# Patient Record
Sex: FEMALE | Race: WHITE | Employment: OTHER | URBAN - METROPOLITAN AREA
[De-identification: names, ages, dates, MRNs, and addresses within clinical notes are randomized per-mention and may not be internally consistent; named-entity substitution may affect disease eponyms.]

---

## 2022-05-07 ENCOUNTER — APPOINTMENT (OUTPATIENT)
Dept: GENERAL RADIOLOGY | Age: 87
End: 2022-05-07
Attending: EMERGENCY MEDICINE
Payer: MEDICARE

## 2022-05-07 ENCOUNTER — HOSPITAL ENCOUNTER (EMERGENCY)
Age: 87
Discharge: HOME | End: 2022-05-07
Attending: EMERGENCY MEDICINE
Payer: MEDICARE

## 2022-05-07 VITALS
OXYGEN SATURATION: 95 % | BODY MASS INDEX: 29.99 KG/M2 | HEART RATE: 64 BPM | RESPIRATION RATE: 14 BRPM | TEMPERATURE: 98 F | WEIGHT: 180 LBS | DIASTOLIC BLOOD PRESSURE: 73 MMHG | SYSTOLIC BLOOD PRESSURE: 177 MMHG | HEIGHT: 65 IN

## 2022-05-07 DIAGNOSIS — E87.1 HYPONATREMIA: ICD-10-CM

## 2022-05-07 DIAGNOSIS — R55 SYNCOPE AND COLLAPSE: Primary | ICD-10-CM

## 2022-05-07 LAB
ALBUMIN SERPL-MCNC: 3.5 G/DL (ref 3.4–5)
ALBUMIN/GLOB SERPL: 1.1 {RATIO} (ref 0.8–1.7)
ALP SERPL-CCNC: 39 U/L (ref 45–117)
ALT SERPL-CCNC: 28 U/L (ref 13–56)
ANION GAP SERPL CALC-SCNC: 6 MMOL/L (ref 3–18)
AST SERPL-CCNC: 21 U/L (ref 10–38)
BASOPHILS # BLD: 0 K/UL (ref 0–0.1)
BASOPHILS NFR BLD: 0 % (ref 0–2)
BILIRUB SERPL-MCNC: 0.4 MG/DL (ref 0.2–1)
BNP SERPL-MCNC: 1265 PG/ML (ref 0–1800)
BUN SERPL-MCNC: 16 MG/DL (ref 7–18)
BUN/CREAT SERPL: 17 (ref 12–20)
CALCIUM SERPL-MCNC: 9 MG/DL (ref 8.5–10.1)
CHLORIDE SERPL-SCNC: 92 MMOL/L (ref 100–111)
CO2 SERPL-SCNC: 32 MMOL/L (ref 21–32)
CREAT SERPL-MCNC: 0.94 MG/DL (ref 0.6–1.3)
DIFFERENTIAL METHOD BLD: ABNORMAL
EOSINOPHIL # BLD: 0.1 K/UL (ref 0–0.4)
EOSINOPHIL NFR BLD: 2 % (ref 0–5)
ERYTHROCYTE [DISTWIDTH] IN BLOOD BY AUTOMATED COUNT: 12.6 % (ref 11.6–14.5)
GLOBULIN SER CALC-MCNC: 3.2 G/DL (ref 2–4)
GLUCOSE SERPL-MCNC: 153 MG/DL (ref 74–99)
HCT VFR BLD AUTO: 40.1 % (ref 35–45)
HGB BLD-MCNC: 13.8 G/DL (ref 12–16)
IMM GRANULOCYTES # BLD AUTO: 0 K/UL (ref 0–0.04)
IMM GRANULOCYTES NFR BLD AUTO: 0 % (ref 0–0.5)
LYMPHOCYTES # BLD: 1.9 K/UL (ref 0.9–3.6)
LYMPHOCYTES NFR BLD: 27 % (ref 21–52)
MAGNESIUM SERPL-MCNC: 2 MG/DL (ref 1.6–2.6)
MCH RBC QN AUTO: 31.8 PG (ref 24–34)
MCHC RBC AUTO-ENTMCNC: 34.4 G/DL (ref 31–37)
MCV RBC AUTO: 92.4 FL (ref 78–100)
MONOCYTES # BLD: 0.6 K/UL (ref 0.05–1.2)
MONOCYTES NFR BLD: 9 % (ref 3–10)
NEUTS SEG # BLD: 4.4 K/UL (ref 1.8–8)
NEUTS SEG NFR BLD: 62 % (ref 40–73)
NRBC # BLD: 0 K/UL (ref 0–0.01)
NRBC BLD-RTO: 0 PER 100 WBC
PLATELET # BLD AUTO: 190 K/UL (ref 135–420)
PMV BLD AUTO: 9 FL (ref 9.2–11.8)
POTASSIUM SERPL-SCNC: 3.7 MMOL/L (ref 3.5–5.5)
PROT SERPL-MCNC: 6.7 G/DL (ref 6.4–8.2)
RBC # BLD AUTO: 4.34 M/UL (ref 4.2–5.3)
SODIUM SERPL-SCNC: 130 MMOL/L (ref 136–145)
TROPONIN-HIGH SENSITIVITY: 11 NG/L (ref 0–54)
TROPONIN-HIGH SENSITIVITY: 17 NG/L (ref 0–54)
WBC # BLD AUTO: 7 K/UL (ref 4.6–13.2)

## 2022-05-07 PROCEDURE — 84484 ASSAY OF TROPONIN QUANT: CPT

## 2022-05-07 PROCEDURE — 80053 COMPREHEN METABOLIC PANEL: CPT

## 2022-05-07 PROCEDURE — 93005 ELECTROCARDIOGRAM TRACING: CPT

## 2022-05-07 PROCEDURE — 83735 ASSAY OF MAGNESIUM: CPT

## 2022-05-07 PROCEDURE — 71045 X-RAY EXAM CHEST 1 VIEW: CPT

## 2022-05-07 PROCEDURE — 85025 COMPLETE CBC W/AUTO DIFF WBC: CPT

## 2022-05-07 PROCEDURE — 99285 EMERGENCY DEPT VISIT HI MDM: CPT

## 2022-05-07 PROCEDURE — 83880 ASSAY OF NATRIURETIC PEPTIDE: CPT

## 2022-05-07 RX ORDER — CLONIDINE HYDROCHLORIDE 0.1 MG/1
TABLET ORAL 2 TIMES DAILY
COMMUNITY

## 2022-05-07 NOTE — ED PROVIDER NOTES
EMERGENCY DEPARTMENT HISTORY AND PHYSICAL EXAM    Date: 5/7/2022  Patient Name: Ese Jacinto    History of Presenting Illness     Chief Complaint   Patient presents with    Unresponsive       History Provided By: Patient, Patient's Daughter and EMS     History Zoey Lala):   9:23 AM  Ese Jacinto is a 82 y.o. female with PMHX of hypertension, CVA who presents to the emergency department by EMS C/O unresponsiveness onset this morning. Associated sxs include low blood pressure. Pt denies recollection of the incident or any other sxs or complaints. Patient and her daughter state that her  just passed away from cancer approximately 2 weeks ago. It was a prolonged fofana for 2 years at home. Patient remembers eating breakfast this morning. Daughter states that her  found the patient in the living room chair where she had been eating, but now she was diaphoretic and not responding. The daughter took the patient's blood pressure and it was low at 70/40. They then called EMS. Daughter reported patient may have taken too much Clonidine. EMS administered 1 mg ATropine for bradycardia 55-60 bmp upon their arrival.  Patient improved and is alert upon arrival to ED. Chief Complaint: unresponsiveness  Onset: today   Timing:  Acute  Context: Symptoms started spontaneously, symptoms have rapidly worsened since onset  Location: generalized  Quality: painless  Severity: Severe  Modifying Factors: Nothing makes it better, or worse. Associated Symptoms: low blood pressure    PCP: Roderick Proctor MD     Past History         Past Medical History:  Past Medical History:   Diagnosis Date    Hypertension     Stroke [facility])        Past Surgical History:  No past surgical history on file. Family History:  No family history on file.   Reviewed and non-contributory    Social History:  Social History     Tobacco Use    Smoking status: Never Smoker    Smokeless tobacco: Never Used   Substance Use Topics    Alcohol use: Not Currently    Drug use: Never       Medications:  Current Outpatient Medications   Medication Sig Dispense Refill    cloNIDine HCL (CATAPRES) 0.1 mg tablet Take  by mouth two (2) times a day. Allergies:  No Known Allergies    Review of Systems      Review of Systems   Constitutional: Negative for chills and fever. HENT: Negative for congestion, rhinorrhea and sore throat. Eyes: Negative for pain and visual disturbance. Respiratory: Negative for cough, shortness of breath and wheezing. Cardiovascular: Negative for chest pain and palpitations. Gastrointestinal: Negative for abdominal pain, diarrhea and vomiting. Genitourinary: Negative for dysuria, flank pain, frequency and urgency. Musculoskeletal: Negative for arthralgias and myalgias. Skin: Negative for rash and wound. Neurological: Positive for syncope. Negative for speech difficulty, weakness, light-headedness and headaches. Psychiatric/Behavioral: Negative for agitation and confusion. All other systems reviewed and are negative. Physical Exam     Vitals:    05/07/22 1242 05/07/22 1246 05/07/22 1522 05/07/22 1702   BP: (!) 153/64 (!) 174/64 (!) 179/81 (!) 177/73   Pulse: 61 61 64 64   Resp: 18 15 20 14   Temp:       SpO2: 96% 97% 100% 95%   Weight:       Height:           Physical Exam  Vitals and nursing note reviewed. Constitutional:       General: She is not in acute distress. Appearance: Normal appearance. She is normal weight. She is not ill-appearing. HENT:      Head: Normocephalic and atraumatic. Nose: Nose normal. No rhinorrhea. Mouth/Throat:      Mouth: Mucous membranes are moist.      Pharynx: No oropharyngeal exudate or posterior oropharyngeal erythema. Eyes:      Extraocular Movements: Extraocular movements intact. Conjunctiva/sclera: Conjunctivae normal.      Pupils: Pupils are equal, round, and reactive to light. Cardiovascular:      Rate and Rhythm: Normal rate and regular rhythm. Heart sounds: No murmur heard. No friction rub. No gallop. Pulmonary:      Effort: Pulmonary effort is normal. No respiratory distress. Breath sounds: Normal breath sounds. No wheezing, rhonchi or rales. Abdominal:      General: Bowel sounds are normal.      Palpations: Abdomen is soft. Tenderness: There is no abdominal tenderness. There is no guarding or rebound. Musculoskeletal:         General: No swelling, tenderness or deformity. Normal range of motion. Cervical back: Normal range of motion and neck supple. No rigidity. Lymphadenopathy:      Cervical: No cervical adenopathy. Skin:     General: Skin is warm and dry. Findings: No rash. Neurological:      General: No focal deficit present. Mental Status: She is alert and oriented to person, place, and time. Psychiatric:         Mood and Affect: Mood normal.         Behavior: Behavior normal.         Diagnostic Study Results     Labs -  Recent Results (from the past 12 hour(s))   CBC WITH AUTOMATED DIFF    Collection Time: 05/07/22  9:15 AM   Result Value Ref Range    WBC 7.0 4.6 - 13.2 K/uL    RBC 4.34 4.20 - 5.30 M/uL    HGB 13.8 12.0 - 16.0 g/dL    HCT 40.1 35.0 - 45.0 %    MCV 92.4 78.0 - 100.0 FL    MCH 31.8 24.0 - 34.0 PG    MCHC 34.4 31.0 - 37.0 g/dL    RDW 12.6 11.6 - 14.5 %    PLATELET 601 410 - 375 K/uL    MPV 9.0 (L) 9.2 - 11.8 FL    NRBC 0.0 0  WBC    ABSOLUTE NRBC 0.00 0.00 - 0.01 K/uL    NEUTROPHILS 62 40 - 73 %    LYMPHOCYTES 27 21 - 52 %    MONOCYTES 9 3 - 10 %    EOSINOPHILS 2 0 - 5 %    BASOPHILS 0 0 - 2 %    IMMATURE GRANULOCYTES 0 0.0 - 0.5 %    ABS. NEUTROPHILS 4.4 1.8 - 8.0 K/UL    ABS. LYMPHOCYTES 1.9 0.9 - 3.6 K/UL    ABS. MONOCYTES 0.6 0.05 - 1.2 K/UL    ABS. EOSINOPHILS 0.1 0.0 - 0.4 K/UL    ABS. BASOPHILS 0.0 0.0 - 0.1 K/UL    ABS. IMM.  GRANS. 0.0 0.00 - 0.04 K/UL    DF AUTOMATED     METABOLIC PANEL, COMPREHENSIVE    Collection Time: 05/07/22  9:15 AM   Result Value Ref Range    Sodium 130 (L) 136 - 145 mmol/L    Potassium 3.7 3.5 - 5.5 mmol/L    Chloride 92 (L) 100 - 111 mmol/L    CO2 32 21 - 32 mmol/L    Anion gap 6 3.0 - 18 mmol/L    Glucose 153 (H) 74 - 99 mg/dL    BUN 16 7.0 - 18 MG/DL    Creatinine 0.94 0.6 - 1.3 MG/DL    BUN/Creatinine ratio 17 12 - 20      GFR est AA >60 >60 ml/min/1.73m2    GFR est non-AA 57 (L) >60 ml/min/1.73m2    Calcium 9.0 8.5 - 10.1 MG/DL    Bilirubin, total 0.4 0.2 - 1.0 MG/DL    ALT (SGPT) 28 13 - 56 U/L    AST (SGOT) 21 10 - 38 U/L    Alk. phosphatase 39 (L) 45 - 117 U/L    Protein, total 6.7 6.4 - 8.2 g/dL    Albumin 3.5 3.4 - 5.0 g/dL    Globulin 3.2 2.0 - 4.0 g/dL    A-G Ratio 1.1 0.8 - 1.7     TROPONIN-HIGH SENSITIVITY    Collection Time: 05/07/22  9:15 AM   Result Value Ref Range    Troponin-High Sensitivity 17 0 - 54 ng/L   MAGNESIUM    Collection Time: 05/07/22  9:15 AM   Result Value Ref Range    Magnesium 2.0 1.6 - 2.6 mg/dL   NT-PRO BNP    Collection Time: 05/07/22  9:15 AM   Result Value Ref Range    NT pro-BNP 1,265 0 - 1,800 PG/ML   TROPONIN-HIGH SENSITIVITY    Collection Time: 05/07/22  2:15 PM   Result Value Ref Range    Troponin-High Sensitivity 11 0 - 54 ng/L       Radiologic Studies -   XR CHEST PORT   Final Result      1. Hyperinflated lungs may be due to inspiratory effort versus air trapping   (asthma/COPD). 2.  No new consolidation, pleural effusion, or pneumothorax. 3.  Atherosclerosis. CT Results  (Last 48 hours)    None        CXR Results  (Last 48 hours)               05/07/22 1020  XR CHEST PORT Final result    Impression:      1. Hyperinflated lungs may be due to inspiratory effort versus air trapping   (asthma/COPD). 2.  No new consolidation, pleural effusion, or pneumothorax. 3.  Atherosclerosis. Narrative:  EXAM: PORTABLE  FRONTAL CHEST RADIOGRAPH       CLINICAL INDICATION/HISTORY: Unresponsive, diaphoretic. Hypotension, syncope.        COMPARISON: 8/31/2007       TECHNIQUE: Portable frontal view of the chest _______________       FINDINGS: The patient is rotated which limits evaluation. SUPPORT DEVICES: EKG leads overlie the patient. HEART AND MEDIASTINUM: Normal heart size and mediastinal contours. Coarse   atherosclerotic calcifications present. LUNGS: The lungs are hyperinflated. No suspicious pulmonary opacities. PLEURAL SPACES:No large pneumothorax. No large pleural effusion. BONY THORAX AND SOFT TISSUES: No acute abnormality. Multilevel degenerative   changes throughout the spine and both shoulders. _______________                 Medications given in the ED-  Medications - No data to display    Procedures     Procedures    ED Course     I am the first provider for this patient. I reviewed the vital signs, available nursing notes, past medical history, past surgical history, family history and social history. Records Reviewed: Nursing Notes    Cardiac Monitor:  Rate: 82 bpm  Rhythm: sinus rhythm    Pulse Oximetry Analysis - 96% on RA    EKG interpretation: (Preliminary)  Rhythm: NSR. Rate: 78 bpm; no STEMI  EKG read by Hannah Jacobson MD at 9:17 AM    9:23 AM Initial assessment performed. The patients presenting problems have been discussed, and they are in agreement with the care plan formulated and outlined with them. I have encouraged them to ask questions as they arise throughout their visit. ED Course as of 05/07/22 2040   Sat May 07, 2022   1616 Patient with some mild hyponatremia. Had a syncopal versus presyncopal event this morning. This was right after taking her blood pressure medicine. Patient has 2 stable troponins in the ED note EKG changes, good blood pressures in the ED. Able to ambulate without difficulty.   Patient does not want to stay in the hospital. [JM]      ED Course User Index  [JM] Onel De La Paz MD           Medical Decision Making     Provider Notes (Medical Decision Making):   DDX: ACS, syncope, UTI, medication effect, electrolyte disorder, hypoglycemia    Discussion:  82 y.o. female with a syncopal event this morning while eating breakfast.  This is likely a result of her blood pressure medicine. Engage the family in shared decision making as her initial blood pressure prior to EMS arrival was low. Requested family see the primary care doctor to adjust her blood pressure medicine. Daughter agrees with that plan when they return back to [place]. They will also check mom's blood pressure each morning prior to giving her her daily dose of her clonidine. Diagnosis and Disposition     DISCHARGE NOTE:  4:47 PM   Zari Wells's  results have been reviewed with her. She has been counseled regarding her diagnosis, treatment, and plan. She verbally conveys understanding and agreement of the signs, symptoms, diagnosis, treatment and prognosis and additionally agrees to follow up as discussed. She also agrees with the care-plan and conveys that all of her questions have been answered. I have also provided discharge instructions for her that include: educational information regarding their diagnosis and treatment, and list of reasons why they would want to return to the ED prior to their follow-up appointment, should her condition change. She has been provided with education for proper emergency department utilization. CLINICAL IMPRESSION:    1. Syncope and collapse    2. Hyponatremia        PLAN:  1. D/C Home  2. Discharge Medication List as of 5/7/2022  4:47 PM        3. Follow-up Information     Follow up With Specialties Details Why Contact Info    Dr. Nguyen Aguilera an appointment as soon as possible for a visit  As soon as possible, For follow up from Emergency Department visit. [address]  [phone]    [facility] Emergency Medicine  As needed;  If symptoms worsen [address] [place] [id number]  [address]     Please note that this dictation was completed with Dragon, the computer voice recognition software. Quite often unanticipated grammatical, syntax, homophones, and other interpretive errors are inadvertently transcribed by the computer software. Please disregard these errors. Please excuse any errors that have escaped final proofreading.     Corrine Mares MD

## 2022-05-07 NOTE — ED TRIAGE NOTES
Arrived by [facility] for initial complaint of unresponsive. Daughter found patient in her living room diaphoretic and minimally responsive. Daughter reported patient may have taken too much Clonidine. EMS administered 1 mg ATropine for bradycardia 55-60bmp.  Patient is alert upon arrival to ED

## 2022-06-01 LAB
ATRIAL RATE: 78 BPM
CALCULATED P AXIS, ECG09: 69 DEGREES
CALCULATED R AXIS, ECG10: -44 DEGREES
CALCULATED T AXIS, ECG11: 25 DEGREES
DIAGNOSIS, 93000: NORMAL
P-R INTERVAL, ECG05: 170 MS
Q-T INTERVAL, ECG07: 420 MS
QRS DURATION, ECG06: 74 MS
QTC CALCULATION (BEZET), ECG08: 478 MS
VENTRICULAR RATE, ECG03: 78 BPM